# Patient Record
Sex: FEMALE | Race: WHITE | Employment: PART TIME | URBAN - METROPOLITAN AREA
[De-identification: names, ages, dates, MRNs, and addresses within clinical notes are randomized per-mention and may not be internally consistent; named-entity substitution may affect disease eponyms.]

---

## 2018-06-26 ENCOUNTER — OFFICE VISIT (OUTPATIENT)
Dept: INTERNAL MEDICINE CLINIC | Age: 23
End: 2018-06-26

## 2018-06-26 VITALS
DIASTOLIC BLOOD PRESSURE: 62 MMHG | HEART RATE: 75 BPM | RESPIRATION RATE: 14 BRPM | WEIGHT: 105.2 LBS | BODY MASS INDEX: 17.96 KG/M2 | SYSTOLIC BLOOD PRESSURE: 90 MMHG | HEIGHT: 64 IN

## 2018-06-26 DIAGNOSIS — Z11.4 ENCOUNTER FOR SCREENING FOR HIV: ICD-10-CM

## 2018-06-26 DIAGNOSIS — Z13.31 POSITIVE DEPRESSION SCREENING: ICD-10-CM

## 2018-06-26 DIAGNOSIS — F95.1 CHRONIC MOTOR TIC: ICD-10-CM

## 2018-06-26 DIAGNOSIS — R53.83 OTHER FATIGUE: Primary | ICD-10-CM

## 2018-06-26 DIAGNOSIS — Z11.3 ROUTINE SCREENING FOR STI (SEXUALLY TRANSMITTED INFECTION): ICD-10-CM

## 2018-06-26 PROCEDURE — G8427 DOCREV CUR MEDS BY ELIG CLIN: HCPCS | Performed by: INTERNAL MEDICINE

## 2018-06-26 PROCEDURE — 99203 OFFICE O/P NEW LOW 30 MIN: CPT | Performed by: INTERNAL MEDICINE

## 2018-06-26 PROCEDURE — G8431 POS CLIN DEPRES SCRN F/U DOC: HCPCS | Performed by: INTERNAL MEDICINE

## 2018-06-26 PROCEDURE — 1036F TOBACCO NON-USER: CPT | Performed by: INTERNAL MEDICINE

## 2018-06-26 PROCEDURE — 96160 PT-FOCUSED HLTH RISK ASSMT: CPT | Performed by: INTERNAL MEDICINE

## 2018-06-26 PROCEDURE — G8419 CALC BMI OUT NRM PARAM NOF/U: HCPCS | Performed by: INTERNAL MEDICINE

## 2018-06-26 ASSESSMENT — ENCOUNTER SYMPTOMS
BACK PAIN: 0
COUGH: 0
TROUBLE SWALLOWING: 0
CONSTIPATION: 0
DIARRHEA: 0
EYE PAIN: 0
ABDOMINAL PAIN: 0
VOMITING: 0
NAUSEA: 0
WHEEZING: 0
SINUS PRESSURE: 0
PHOTOPHOBIA: 0
COLOR CHANGE: 0
SINUS PAIN: 0
SHORTNESS OF BREATH: 0

## 2018-06-26 ASSESSMENT — PATIENT HEALTH QUESTIONNAIRE - PHQ9
6. FEELING BAD ABOUT YOURSELF - OR THAT YOU ARE A FAILURE OR HAVE LET YOURSELF OR YOUR FAMILY DOWN: 1
5. POOR APPETITE OR OVEREATING: 2
7. TROUBLE CONCENTRATING ON THINGS, SUCH AS READING THE NEWSPAPER OR WATCHING TELEVISION: 0
3. TROUBLE FALLING OR STAYING ASLEEP: 0
1. LITTLE INTEREST OR PLEASURE IN DOING THINGS: 1
10. IF YOU CHECKED OFF ANY PROBLEMS, HOW DIFFICULT HAVE THESE PROBLEMS MADE IT FOR YOU TO DO YOUR WORK, TAKE CARE OF THINGS AT HOME, OR GET ALONG WITH OTHER PEOPLE: 2
4. FEELING TIRED OR HAVING LITTLE ENERGY: 3
SUM OF ALL RESPONSES TO PHQ9 QUESTIONS 1 & 2: 3
9. THOUGHTS THAT YOU WOULD BE BETTER OFF DEAD, OR OF HURTING YOURSELF: 0
8. MOVING OR SPEAKING SO SLOWLY THAT OTHER PEOPLE COULD HAVE NOTICED. OR THE OPPOSITE, BEING SO FIGETY OR RESTLESS THAT YOU HAVE BEEN MOVING AROUND A LOT MORE THAN USUAL: 3
2. FEELING DOWN, DEPRESSED OR HOPELESS: 2
SUM OF ALL RESPONSES TO PHQ QUESTIONS 1-9: 12

## 2018-06-27 LAB
C. TRACHOMATIS DNA ,URINE: NEGATIVE
N. GONORRHOEAE DNA, URINE: NEGATIVE

## 2018-07-02 ENCOUNTER — OFFICE VISIT (OUTPATIENT)
Dept: PSYCHOLOGY | Age: 23
End: 2018-07-02

## 2018-07-02 DIAGNOSIS — F41.9 ANXIETY: Primary | ICD-10-CM

## 2018-07-02 PROCEDURE — 90791 PSYCH DIAGNOSTIC EVALUATION: CPT | Performed by: PSYCHOLOGIST

## 2018-07-02 ASSESSMENT — PATIENT HEALTH QUESTIONNAIRE - PHQ9
SUM OF ALL RESPONSES TO PHQ9 QUESTIONS 1 & 2: 2
SUM OF ALL RESPONSES TO PHQ QUESTIONS 1-9: 2
2. FEELING DOWN, DEPRESSED OR HOPELESS: 1
1. LITTLE INTEREST OR PLEASURE IN DOING THINGS: 1

## 2018-07-02 ASSESSMENT — ANXIETY QUESTIONNAIRES
5. BEING SO RESTLESS THAT IT IS HARD TO SIT STILL: 1-SEVERAL DAYS
2. NOT BEING ABLE TO STOP OR CONTROL WORRYING: 2-OVER HALF THE DAYS
1. FEELING NERVOUS, ANXIOUS, OR ON EDGE: 1-SEVERAL DAYS
6. BECOMING EASILY ANNOYED OR IRRITABLE: 3-NEARLY EVERY DAY
7. FEELING AFRAID AS IF SOMETHING AWFUL MIGHT HAPPEN: 1-SEVERAL DAYS
3. WORRYING TOO MUCH ABOUT DIFFERENT THINGS: 2-OVER HALF THE DAYS
GAD7 TOTAL SCORE: 12
4. TROUBLE RELAXING: 2-OVER HALF THE DAYS

## 2018-07-02 NOTE — PROGRESS NOTES
Behavioral Health Consultation  SUNIL Vuong,Ph.D.  Psychologist  7/2/2018  8:31 AM      Time spent with Patient: 30 minutes  This is patient's first  [facility] appointment. Reason for Consult:    Chief Complaint   Patient presents with    Depression     Referring Provider: Warren Mauricio MD  [address], [address]    Pt provided informed consent for the behavioral health program. Discussed with patient model of service to include the limits of confidentiality (i.e. abuse reporting, suicide intervention, etc.) and short-term intervention focused approach. Pt indicated understanding. Feedback given to PCP. S:  Pt wonders if she has BPD, has never seen a psychologist before. Pt denies any h/o abuse, chaos in the home during childhood. Pt reports reading about BPD online and recognizing herself in it. Has fear of abandonment, ronald relationships. Some days she can be clear that if someone doesn't like her, it's fine. Other days is overly upset by it. Pt with anxiety but not sure what it's about and not totally self-aware, her roommate told pt she sees her being anxious. Poor appetite. Skips meals sometimes. Fatigue. Goes to the pool or walks sometimes, about once a week to the pool but not really actively swimming. Reading more. Pt also has a tic, pt doesn't notice it, increases with stress. O:  MSE:    Mood    Anxious  Affect    normal affect  Appetite abnormal: low  Sleep disturbance No  Fatigue Yes  Loss of pleasure No  Attention/Concentration    intact  Morbid ideation No  Suicide Assessment    no suicidal ideation      History:    Medications:   No current outpatient prescriptions on file. No current facility-administered medications for this visit.         Social History:   Social History     Social History    Marital status: Single     Spouse name: N/A    Number of children: N/A    Years of education: 12.5     Occupational History     [employer]     Social History Main Topics    Smoking status: Never Smoker    Smokeless tobacco: Never Used    Alcohol use Yes      Comment: 0-3 a day     Drug use: No    Sexual activity: No      Comment: virginal     Other Topics Concern    Not on file     Social History Narrative    Apartment in [place]. Boyfriend and roommate. Feels safe at home. TOBACCO:   reports that she has never smoked. She has never used smokeless tobacco.  ETOH:   reports that she drinks alcohol. Family History:   Family History   Problem Relation Age of Onset   Cam Ziyad Breast Cancer Mother     Hypertension Father     Heart Attack Father          A:  Pt with anxiety but not very self-aware. Has some borderline tendencies but not sufficient to warrant diagnosis. No h/o trauma but clear fear of rejection, which causes her to become agitated and upset by slight perceived rejections. Not always eating at mealtimes. No exercise. Lots of fatigue. PHQ Scores 7/2/2018 6/26/2018   PHQ2 Score 2 3   PHQ9 Score 2 12     Interpretation of Total Score Depression Severity: 1-4 = Minimal depression, 5-9 = Mild depression, 10-14 = Moderate depression, 15-19 = Moderately severe depression, 20-27 = Severe depression    JULIAN 7 SCORE 7/2/2018   JULIAN-7 Total Score 12     Interpretation of JULIAN-7 score: 5-9 = mild anxiety, 10-14 = moderate anxiety, 15+ = severe anxiety. Recommend referral to behavioral health for scores 10 or greater. Diagnosis:    1.  Anxiety          Diagnosis Date    VSD (ventricular septal defect)          Plan:  Pt interventions:  Discussed various factors related to the development and maintenance of  anxiety (including biological, cognitive, behavioral, and environmental factors), Trained in strategies for increasing balanced thinking, Discussed and set plan for behavioral activation, Trained in relaxation strategies, Provided education, Discussed self-care (sleep, nutrition, rewarding activities, social support, exercise), Discussed and problem-solved barriers in adhering to behavioral change plan, Motivational Interviewing to increase patient confidence and compliance with adhering to behavioral change plan and Motivational Interviewing to determine importance and readiness for change      Pt Behavioral Change Plan:  See Pt Instructions

## 2018-07-23 ENCOUNTER — OFFICE VISIT (OUTPATIENT)
Dept: PSYCHOLOGY | Age: 23
End: 2018-07-23

## 2018-07-23 DIAGNOSIS — F41.9 ANXIETY: Primary | ICD-10-CM

## 2018-07-23 PROCEDURE — 90832 PSYTX W PT 30 MINUTES: CPT | Performed by: PSYCHOLOGIST

## 2018-07-23 ASSESSMENT — ANXIETY QUESTIONNAIRES
2. NOT BEING ABLE TO STOP OR CONTROL WORRYING: 0-NOT AT ALL SURE
GAD7 TOTAL SCORE: 5
6. BECOMING EASILY ANNOYED OR IRRITABLE: 1-SEVERAL DAYS
1. FEELING NERVOUS, ANXIOUS, OR ON EDGE: 1-SEVERAL DAYS
7. FEELING AFRAID AS IF SOMETHING AWFUL MIGHT HAPPEN: 0-NOT AT ALL SURE
5. BEING SO RESTLESS THAT IT IS HARD TO SIT STILL: 2-OVER HALF THE DAYS
4. TROUBLE RELAXING: 1-SEVERAL DAYS
3. WORRYING TOO MUCH ABOUT DIFFERENT THINGS: 0-NOT AT ALL SURE

## 2018-07-23 ASSESSMENT — PATIENT HEALTH QUESTIONNAIRE - PHQ9
SUM OF ALL RESPONSES TO PHQ QUESTIONS 1-9: 0
2. FEELING DOWN, DEPRESSED OR HOPELESS: 0
1. LITTLE INTEREST OR PLEASURE IN DOING THINGS: 0
SUM OF ALL RESPONSES TO PHQ9 QUESTIONS 1 & 2: 0

## 2018-07-23 NOTE — PROGRESS NOTES
Behavioral Health Consultation  SUNIL Vuong,Ph.D.  Psychologist  7/23/2018  9:02 AM      Time spent with Patient: 30 minutes  This is patient's second  [facility] appointment. Reason for Consult:    Chief Complaint   Patient presents with    Anxiety     Referring Provider: Mahesh Vogt MD  [address] [facility] [place] [facility]""  [facility], [address]      Feedback given to PCP. S:  Pt reports feeling better. Has been walking more, doing this with boyfriend. Has more energy. Had a little trip with family and enjoyed that. Started to work on the list of what makes her awesome. Has been eating 3 times per day, even when not hungry and feeling better. Using skills. It helped. Still has a habit of shaking her head, more when stressed. Sometimes she said her head feels heavy and so she shakes her head to relieve it. Pt demonstrated the head shaking for me and she dropped her head forward and then shook it side to side while dropped. 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 O:  MSE:    Mood    Anxious  Affect    normal affect  Appetite normal  Sleep disturbance No  Fatigue No  Loss of pleasure No  Attention/Concentration    intact  Morbid ideation No  Suicide Assessment    no suicidal ideation        A:  Pt is using skills, eating regularly, and walking more. Feels much better. Excited about starting new job at [employer]. Still bothered by head shaking, will try skills to interrupt it as she can stop it when she is aware of it. Pt expressed confidence in ability to continue to use skills to manage anxiety. Provided pt with some DBT handouts for further skill building.     PHQ Scores 7/23/2018 7/2/2018 6/26/2018   PHQ2 Score 0 2 3   PHQ9 Score 0 2 12     Interpretation of Total Score Depression Severity: 1-4 = Minimal depression, 5-9 = Mild depression, 10-14 = Moderate depression, 15-19 = Moderately severe depression, 20-27 = Severe depression    How often pt has had thoughts of death or hurting self (if PHQ positive for depression):         JULIAN 7 SCORE 7/23/2018 7/2/2018   JULIAN-7 Total Score 5 12     Interpretation of JULIAN-7 score: 5-9 = mild anxiety, 10-14 = moderate anxiety, 15+ = severe anxiety. Recommend referral to behavioral health for scores 10 or greater. Diagnosis:    1.  Anxiety          Diagnosis Date    VSD (ventricular septal defect)          Plan:  Pt interventions:  Trained in strategies for increasing balanced thinking, Provided education, Discussed self-care (sleep, nutrition, rewarding activities, social support, exercise), Discussed and problem-solved barriers in adhering to behavioral change plan, Motivational Interviewing to increase patient confidence and compliance with adhering to behavioral change plan and Motivational Interviewing to determine importance and readiness for change      Pt Behavioral Change Plan:  See Pt Instructions

## 2018-10-24 ENCOUNTER — OFFICE VISIT (OUTPATIENT)
Dept: PSYCHOLOGY | Age: 23
End: 2018-10-24

## 2018-10-24 DIAGNOSIS — F41.9 ANXIETY: Primary | ICD-10-CM

## 2018-10-24 PROCEDURE — 90832 PSYTX W PT 30 MINUTES: CPT | Performed by: PSYCHOLOGIST

## 2018-10-24 ASSESSMENT — PATIENT HEALTH QUESTIONNAIRE - PHQ9
10. IF YOU CHECKED OFF ANY PROBLEMS, HOW DIFFICULT HAVE THESE PROBLEMS MADE IT FOR YOU TO DO YOUR WORK, TAKE CARE OF THINGS AT HOME, OR GET ALONG WITH OTHER PEOPLE: 3
3. TROUBLE FALLING OR STAYING ASLEEP: 3
9. THOUGHTS THAT YOU WOULD BE BETTER OFF DEAD, OR OF HURTING YOURSELF: 0
8. MOVING OR SPEAKING SO SLOWLY THAT OTHER PEOPLE COULD HAVE NOTICED. OR THE OPPOSITE, BEING SO FIGETY OR RESTLESS THAT YOU HAVE BEEN MOVING AROUND A LOT MORE THAN USUAL: 2
2. FEELING DOWN, DEPRESSED OR HOPELESS: 2
6. FEELING BAD ABOUT YOURSELF - OR THAT YOU ARE A FAILURE OR HAVE LET YOURSELF OR YOUR FAMILY DOWN: 1
1. LITTLE INTEREST OR PLEASURE IN DOING THINGS: 1
SUM OF ALL RESPONSES TO PHQ QUESTIONS 1-9: 14
4. FEELING TIRED OR HAVING LITTLE ENERGY: 3
SUM OF ALL RESPONSES TO PHQ9 QUESTIONS 1 & 2: 3
SUM OF ALL RESPONSES TO PHQ QUESTIONS 1-9: 14
7. TROUBLE CONCENTRATING ON THINGS, SUCH AS READING THE NEWSPAPER OR WATCHING TELEVISION: 2
5. POOR APPETITE OR OVEREATING: 0

## 2018-10-24 ASSESSMENT — ANXIETY QUESTIONNAIRES
GAD7 TOTAL SCORE: 13
3. WORRYING TOO MUCH ABOUT DIFFERENT THINGS: 2-OVER HALF THE DAYS
2. NOT BEING ABLE TO STOP OR CONTROL WORRYING: 2-OVER HALF THE DAYS
7. FEELING AFRAID AS IF SOMETHING AWFUL MIGHT HAPPEN: 1-SEVERAL DAYS
5. BEING SO RESTLESS THAT IT IS HARD TO SIT STILL: 1-SEVERAL DAYS
4. TROUBLE RELAXING: 2-OVER HALF THE DAYS
6. BECOMING EASILY ANNOYED OR IRRITABLE: 3-NEARLY EVERY DAY
1. FEELING NERVOUS, ANXIOUS, OR ON EDGE: 2-OVER HALF THE DAYS

## 2018-10-24 NOTE — PROGRESS NOTES
Behavioral Health Consultation  SUNIL Vuong,Ph.D.  Psychologist  10/24/2018  8:52 AM      Time spent with Patient: 30 minutes  This is patient's third  [facility] appointment. Reason for Consult:    Chief Complaint   Patient presents with    Anxiety     Referring Provider: Beth Pandey MD  [address] [facility] [address], [address]      Feedback given to PCP. S:  Pt has had some changes in her life recently, still trying to adjust to them. More tension between her and her boyfriend when she started working nights. She ended that relationship. Still shares a bedroom. Farnam is still strong but ex-boyfriend is asking pt to move out, no extra bedroom in their apartment. Realizes she's emotionally dependent on her roommates as she moved her from [facility]. Thinking about moving back to [facility], her friends are not supportive of that because she reports that's not a healthy environment. She does tend to act on an idea before thinks it all out. Can be impulsive and self-absorbed in this mode. Likes the pay and the work at her new job, works 10 pm to 6 am.  Still trying to find a good sleep schedule. Can fall asleep when first gets home from work, sleeps for about 3 hours. Then naps about 4 hours before goes to work. Just got her DL. Looking for a car. Has been using skills with benefit. Has started a romance with an old friend who is in [place] [id number] right now, forcing her to take things slowly. O:  MSE:    Mood    Anxious  Affect    anxiety  Appetite normal  Sleep disturbance Yes  Fatigue Yes  Loss of pleasure No  Attention/Concentration    intact  Morbid ideation No  Suicide Assessment    no suicidal ideation        A:  Pt broke up with her boyfriend that she is living with and he has asked her to move out. She tends to be impulsive in such situations and was also scattered in trying to find her next step. She has since decided to find her own apartment, got her DL and is looking for a car.   Has started to

## 2018-10-24 NOTE — PATIENT INSTRUCTIONS
1. Get some blackout curtains for your bedroom  2. Skip the nap (or keep it short)  3. Move forward with your plan to get your place locally  4. Keep using skills  5. For future major life decisions, make a spreadsheet of all of your options (the brainstorming is good and then reel it in)--maybe do the brainstorming on paper AND include your friends from the beginning  10. Let me know if you can't find the handouts from before  7. Let him lead in the new relationship and don't take it personally if he moves slower  8.  Return as needed